# Patient Record
Sex: MALE | Race: WHITE | NOT HISPANIC OR LATINO | Employment: OTHER | URBAN - METROPOLITAN AREA
[De-identification: names, ages, dates, MRNs, and addresses within clinical notes are randomized per-mention and may not be internally consistent; named-entity substitution may affect disease eponyms.]

---

## 2024-01-16 ENCOUNTER — APPOINTMENT (OUTPATIENT)
Dept: RADIOLOGY | Facility: HOSPITAL | Age: 64
End: 2024-01-16
Payer: MEDICAID

## 2024-01-16 ENCOUNTER — HOSPITAL ENCOUNTER (EMERGENCY)
Facility: HOSPITAL | Age: 64
Discharge: HOME | End: 2024-01-16
Attending: STUDENT IN AN ORGANIZED HEALTH CARE EDUCATION/TRAINING PROGRAM
Payer: MEDICAID

## 2024-01-16 ENCOUNTER — APPOINTMENT (OUTPATIENT)
Dept: CARDIOLOGY | Facility: HOSPITAL | Age: 64
End: 2024-01-16
Payer: MEDICAID

## 2024-01-16 VITALS
OXYGEN SATURATION: 98 % | WEIGHT: 150 LBS | HEIGHT: 70 IN | TEMPERATURE: 99.5 F | DIASTOLIC BLOOD PRESSURE: 88 MMHG | SYSTOLIC BLOOD PRESSURE: 131 MMHG | HEART RATE: 88 BPM | BODY MASS INDEX: 21.47 KG/M2 | RESPIRATION RATE: 18 BRPM

## 2024-01-16 DIAGNOSIS — R06.2 WHEEZING: ICD-10-CM

## 2024-01-16 DIAGNOSIS — J01.90 SUBACUTE SINUSITIS, UNSPECIFIED LOCATION: Primary | ICD-10-CM

## 2024-01-16 DIAGNOSIS — R05.2 SUBACUTE COUGH: ICD-10-CM

## 2024-01-16 DIAGNOSIS — J04.0 LARYNGITIS: ICD-10-CM

## 2024-01-16 LAB
ALBUMIN SERPL-MCNC: 4.3 G/DL (ref 3.5–5)
ALP BLD-CCNC: 88 U/L (ref 35–125)
ALT SERPL-CCNC: 13 U/L (ref 5–40)
ANION GAP SERPL CALC-SCNC: 13 MMOL/L
AST SERPL-CCNC: 20 U/L (ref 5–40)
BASOPHILS # BLD AUTO: 0.02 X10*3/UL (ref 0–0.1)
BASOPHILS NFR BLD AUTO: 0.2 %
BILIRUB SERPL-MCNC: 1.4 MG/DL (ref 0.1–1.2)
BUN SERPL-MCNC: 14 MG/DL (ref 8–25)
CALCIUM SERPL-MCNC: 9.3 MG/DL (ref 8.5–10.4)
CHLORIDE SERPL-SCNC: 103 MMOL/L (ref 97–107)
CO2 SERPL-SCNC: 24 MMOL/L (ref 24–31)
CREAT SERPL-MCNC: 1 MG/DL (ref 0.4–1.6)
EGFRCR SERPLBLD CKD-EPI 2021: 85 ML/MIN/1.73M*2
EOSINOPHIL # BLD AUTO: 0.03 X10*3/UL (ref 0–0.7)
EOSINOPHIL NFR BLD AUTO: 0.4 %
ERYTHROCYTE [DISTWIDTH] IN BLOOD BY AUTOMATED COUNT: 12 % (ref 11.5–14.5)
FLUAV RNA RESP QL NAA+PROBE: NOT DETECTED
FLUBV RNA RESP QL NAA+PROBE: NOT DETECTED
GLUCOSE SERPL-MCNC: 104 MG/DL (ref 65–99)
HCT VFR BLD AUTO: 40.2 % (ref 41–52)
HGB BLD-MCNC: 13.5 G/DL (ref 13.5–17.5)
IMM GRANULOCYTES # BLD AUTO: 0.02 X10*3/UL (ref 0–0.7)
IMM GRANULOCYTES NFR BLD AUTO: 0.2 % (ref 0–0.9)
LYMPHOCYTES # BLD AUTO: 1.38 X10*3/UL (ref 1.2–4.8)
LYMPHOCYTES NFR BLD AUTO: 16.6 %
MCH RBC QN AUTO: 31.3 PG (ref 26–34)
MCHC RBC AUTO-ENTMCNC: 33.6 G/DL (ref 32–36)
MCV RBC AUTO: 93 FL (ref 80–100)
MONOCYTES # BLD AUTO: 0.52 X10*3/UL (ref 0.1–1)
MONOCYTES NFR BLD AUTO: 6.2 %
NEUTROPHILS # BLD AUTO: 6.36 X10*3/UL (ref 1.2–7.7)
NEUTROPHILS NFR BLD AUTO: 76.4 %
NRBC BLD-RTO: 0 /100 WBCS (ref 0–0)
NT-PROBNP SERPL-MCNC: 125 PG/ML (ref 0–177)
PLATELET # BLD AUTO: 207 X10*3/UL (ref 150–450)
POTASSIUM SERPL-SCNC: 3.7 MMOL/L (ref 3.4–5.1)
PROT SERPL-MCNC: 6.6 G/DL (ref 5.9–7.9)
RBC # BLD AUTO: 4.32 X10*6/UL (ref 4.5–5.9)
RSV RNA RESP QL NAA+PROBE: NOT DETECTED
SARS-COV-2 RNA RESP QL NAA+PROBE: NOT DETECTED
SODIUM SERPL-SCNC: 140 MMOL/L (ref 133–145)
TROPONIN T SERPL-MCNC: 14 NG/L
TROPONIN T SERPL-MCNC: 17 NG/L
WBC # BLD AUTO: 8.3 X10*3/UL (ref 4.4–11.3)

## 2024-01-16 PROCEDURE — 2500000001 HC RX 250 WO HCPCS SELF ADMINISTERED DRUGS (ALT 637 FOR MEDICARE OP): Performed by: CLINICAL NURSE SPECIALIST

## 2024-01-16 PROCEDURE — 36415 COLL VENOUS BLD VENIPUNCTURE: CPT | Performed by: CLINICAL NURSE SPECIALIST

## 2024-01-16 PROCEDURE — 99285 EMERGENCY DEPT VISIT HI MDM: CPT | Performed by: STUDENT IN AN ORGANIZED HEALTH CARE EDUCATION/TRAINING PROGRAM

## 2024-01-16 PROCEDURE — 96374 THER/PROPH/DIAG INJ IV PUSH: CPT

## 2024-01-16 PROCEDURE — 94640 AIRWAY INHALATION TREATMENT: CPT | Mod: 59

## 2024-01-16 PROCEDURE — 70491 CT SOFT TISSUE NECK W/DYE: CPT

## 2024-01-16 PROCEDURE — 84484 ASSAY OF TROPONIN QUANT: CPT | Performed by: CLINICAL NURSE SPECIALIST

## 2024-01-16 PROCEDURE — 2550000001 HC RX 255 CONTRASTS: Performed by: STUDENT IN AN ORGANIZED HEALTH CARE EDUCATION/TRAINING PROGRAM

## 2024-01-16 PROCEDURE — 87637 SARSCOV2&INF A&B&RSV AMP PRB: CPT | Performed by: CLINICAL NURSE SPECIALIST

## 2024-01-16 PROCEDURE — 2500000004 HC RX 250 GENERAL PHARMACY W/ HCPCS (ALT 636 FOR OP/ED): Performed by: CLINICAL NURSE SPECIALIST

## 2024-01-16 PROCEDURE — 85025 COMPLETE CBC W/AUTO DIFF WBC: CPT | Performed by: CLINICAL NURSE SPECIALIST

## 2024-01-16 PROCEDURE — 93005 ELECTROCARDIOGRAM TRACING: CPT

## 2024-01-16 PROCEDURE — 71046 X-RAY EXAM CHEST 2 VIEWS: CPT

## 2024-01-16 PROCEDURE — 83880 ASSAY OF NATRIURETIC PEPTIDE: CPT | Performed by: CLINICAL NURSE SPECIALIST

## 2024-01-16 PROCEDURE — 80053 COMPREHEN METABOLIC PANEL: CPT | Performed by: CLINICAL NURSE SPECIALIST

## 2024-01-16 RX ORDER — BENZONATATE 100 MG/1
100 CAPSULE ORAL 3 TIMES DAILY PRN
Qty: 21 CAPSULE | Refills: 0 | Status: SHIPPED | OUTPATIENT
Start: 2024-01-16 | End: 2024-01-23

## 2024-01-16 RX ORDER — ACETAMINOPHEN 325 MG/1
650 TABLET ORAL ONCE
Status: COMPLETED | OUTPATIENT
Start: 2024-01-16 | End: 2024-01-16

## 2024-01-16 RX ORDER — FAMOTIDINE 10 MG/ML
20 INJECTION INTRAVENOUS ONCE
Status: COMPLETED | OUTPATIENT
Start: 2024-01-16 | End: 2024-01-16

## 2024-01-16 RX ORDER — AMOXICILLIN AND CLAVULANATE POTASSIUM 875; 125 MG/1; MG/1
1 TABLET, FILM COATED ORAL 2 TIMES DAILY
Qty: 20 TABLET | Refills: 0 | Status: SHIPPED | OUTPATIENT
Start: 2024-01-16 | End: 2024-01-26

## 2024-01-16 RX ORDER — ALBUTEROL SULFATE 90 UG/1
2 AEROSOL, METERED RESPIRATORY (INHALATION) EVERY 4 HOURS PRN
Qty: 18 G | Refills: 0 | Status: SHIPPED | OUTPATIENT
Start: 2024-01-16 | End: 2024-01-23

## 2024-01-16 RX ORDER — GUAIFENESIN 100 MG/5ML
200 SOLUTION ORAL 3 TIMES DAILY PRN
Qty: 120 ML | Refills: 0 | Status: SHIPPED | OUTPATIENT
Start: 2024-01-16 | End: 2024-01-21

## 2024-01-16 RX ORDER — AMOXICILLIN AND CLAVULANATE POTASSIUM 875; 125 MG/1; MG/1
875 TABLET, FILM COATED ORAL ONCE
Status: COMPLETED | OUTPATIENT
Start: 2024-01-16 | End: 2024-01-16

## 2024-01-16 RX ADMIN — AMOXICILLIN AND CLAVULANATE POTASSIUM 875 MG: 875; 125 TABLET ORAL at 19:13

## 2024-01-16 RX ADMIN — FAMOTIDINE 20 MG: 10 INJECTION, SOLUTION INTRAVENOUS at 14:44

## 2024-01-16 RX ADMIN — IOHEXOL 75 ML: 350 INJECTION, SOLUTION INTRAVENOUS at 15:53

## 2024-01-16 RX ADMIN — SODIUM CHLORIDE 1000 ML: 9 INJECTION, SOLUTION INTRAVENOUS at 14:44

## 2024-01-16 RX ADMIN — RACEPINEPHRINE HYDROCHLORIDE 0.5 ML: 11.25 SOLUTION RESPIRATORY (INHALATION) at 18:26

## 2024-01-16 RX ADMIN — ACETAMINOPHEN 650 MG: 325 TABLET ORAL at 18:25

## 2024-01-16 ASSESSMENT — PAIN SCALES - GENERAL
PAINLEVEL_OUTOF10: 6
PAINLEVEL_OUTOF10: 2
PAINLEVEL_OUTOF10: 2

## 2024-01-16 ASSESSMENT — LIFESTYLE VARIABLES
EVER FELT BAD OR GUILTY ABOUT YOUR DRINKING: NO
HAVE YOU EVER FELT YOU SHOULD CUT DOWN ON YOUR DRINKING: NO
EVER HAD A DRINK FIRST THING IN THE MORNING TO STEADY YOUR NERVES TO GET RID OF A HANGOVER: NO
HAVE PEOPLE ANNOYED YOU BY CRITICIZING YOUR DRINKING: NO
REASON UNABLE TO ASSESS: NO

## 2024-01-16 ASSESSMENT — PAIN - FUNCTIONAL ASSESSMENT: PAIN_FUNCTIONAL_ASSESSMENT: 0-10

## 2024-01-16 ASSESSMENT — PAIN DESCRIPTION - ONSET: ONSET: ONGOING

## 2024-01-16 ASSESSMENT — COLUMBIA-SUICIDE SEVERITY RATING SCALE - C-SSRS
2. HAVE YOU ACTUALLY HAD ANY THOUGHTS OF KILLING YOURSELF?: NO
1. IN THE PAST MONTH, HAVE YOU WISHED YOU WERE DEAD OR WISHED YOU COULD GO TO SLEEP AND NOT WAKE UP?: NO
6. HAVE YOU EVER DONE ANYTHING, STARTED TO DO ANYTHING, OR PREPARED TO DO ANYTHING TO END YOUR LIFE?: NO

## 2024-01-16 ASSESSMENT — PAIN DESCRIPTION - DESCRIPTORS
DESCRIPTORS_3: TIGHTNESS
DESCRIPTORS: PRESSURE

## 2024-01-16 ASSESSMENT — PAIN DESCRIPTION - LOCATION
LOCATION: CHEST
LOCATION_3: ABDOMEN
LOCATION_2: THROAT

## 2024-01-16 ASSESSMENT — PAIN DESCRIPTION - PROGRESSION: CLINICAL_PROGRESSION: NOT CHANGED

## 2024-01-16 ASSESSMENT — PAIN DESCRIPTION - PAIN TYPE: TYPE: ACUTE PAIN

## 2024-01-16 NOTE — Clinical Note
Joo Lockhart was seen and treated in our emergency department on 1/16/2024.  He may return to work on 01/19/2024.  1-3 days if needed      If you have any questions or concerns, please don't hesitate to call.      Geno Donaldson, APRN-CNP

## 2024-01-16 NOTE — ED TRIAGE NOTES
63-year-old male presents with shortness of breath hoarse voice cough patient reports he has been having issues for some time now he is seeing his doctor they have been trying to figure out what is wrong with him for years.  Was seen evaluated by his doctor for tightness in his epigastric region reports when he coughs feels like it gets stuck.  Then he had a fall where he applied a heating pad to his back and his symptoms went away for 5 days he felt like a new person and that his symptoms returned.  Recently over the last 4 to 5 days he has noticed that his voice is more hoarse.  He has to bend over to cough.  Feels like his throat is tight and his vocal cords are stuck.  He denies any fever or chills.  Denies any abdominal pain other than his chronic abdominal pain.  No chest pain complains of wheezing he is a smoker but has not smoked today.  No ear pain no runny nose.  He does complain of sinus drainage..  Patient reports if he can stick a toothbrush to his throat and it seems to alleviate some of his symptoms.  .  Patient states he cannot do breathing treatments and cannot steroids because he does not compulsions in his neck.  Clinical presentation:  HEENT: Head normal cephalic atraumatic voice is hoarse.  Bilateral tympanic membranes pearly gray and intact.  No pain palpation of the maxillary frontal sinuses.  Posterior pharynx nonerythematous clear drainage.  Uvula midline palate symmetrical no signs of liquids angina or peritonsillar abscess    Neck: Supple no adenopathy no nuchal rigidity stridor trismus noted.  Trachea midline  Heart: Regular rate and rhythm S1-S2 no murmurs rubs or gallops mild tachycardia noted  Lungs: Mild tachypnea noted.  Clear to auscultation patient has a audible wheeze noted with talking.  No use of accessory muscles or nasal flaring noted no pursed of breathing or tripod positioning noted.  Harsh cough that is forced.    Abdomen: Soft nontender nondistended bowel sounds present x  4.  No guarding or rigidity negative for Andres sign abrades point tenderness.  No peritoneal signs noted.    Vital signs reviewed temp 99.5 heart rate 105 respiratory rate 24  98% on room air    Differentials include but not limited to viral illness versus COVID versus flu versus RSV versus vocal cord abnormality versus reflux versus COPD versus electrolyte abnormality versus dehydration.  Versus cervical foreign body low suspicion for acute coronary syndrome.    Plan:  Famotidine  Normal saline  Chest x-ray  EKG  CBC  CMP  proBNP  RSV  COVID flu  Troponin  Patient seen via provider in triage.  There was a limited examination due to the triage capacity.  The appropriate studies have been ordered in triage.  Patient should be seen for full evaluation/examination as well as follow-up for any completed imaging or lab work.

## 2024-01-16 NOTE — DISCHARGE INSTRUCTIONS
Increase fluids  Warm salt water gargles for sore throat pain and drainage do not swallow  Warm compresses to the face to help with sinus congestion  Flonase over-the-counter as directed  Take antibiotic with food, full glass water and supplement yogurt in your diet have a side effect of diarrhea  Albuterol inhaler for shortness of breath and wheezing  Guaifenesin for cough and congestion  Tessalon Perles for cough  Follow-up with primary care physician in 2 days for reevaluation  Follow-up with ENT within 1 week  Return to the emergency department any worsening symptoms or concerns  Coolmist vaporizer in the home  Today it was noted that your blood pressure was elevated follow-up with your primary care physician for reevaluation.  Journal your blood pressure daily and provide information to your primary care physician  Return to the emergency department any worsening symptoms or concerns  Try honey to help with cough

## 2024-01-16 NOTE — ED TRIAGE NOTES
Department of Emergency Medicine   ED  Provider Note  Admit Date/RoomTime: 1/16/2024  1:53 PM  ED Room: RESULTSWAITING/NONE        History of Present Illness:  Chief Complaint   Patient presents with    Shortness of Breath     SOB, sore throat, cough, chest pain 2/10 chest pain that goes up to 8/10 when coughing, feels stomach is tight 6/10, hoarse voice. Hx of sinus drainage problems but states his phlegm is never usually this dry.         Joo Lockhart is a   63-year-old male presents with shortness of breath hoarse voice cough patient reports he has been having issues for some time now he is seeing his doctor they have been trying to figure out what is wrong with him for years.  Was seen evaluated by his doctor for tightness in his epigastric region reports when he coughs feels like it gets stuck.  Then he had a fall where he applied a heating pad to his back and his symptoms went away for 5 days he felt like a new person and that his symptoms returned.  Recently over the last 4 to 5 days he has noticed that his voice is more hoarse.  He has to bend over to cough.  Feels like his throat is tight and his vocal cords are stuck.  He denies any fever or chills.  Denies any abdominal pain other than his chronic abdominal pain.  No chest pain complains of wheezing he is a smoker but has not smoked today.  No ear pain no runny nose.  He does complain of sinus drainage..  Patient reports if he can stick a toothbrush to his throat and it seems to alleviate some of his symptoms.  .  Patient states he cannot do breathing treatments and cannot steroids because he does not compulsions in his neck.         Review of Systems:   Pertinent positives and negatives are stated within HPI, all other systems reviewed and are negative.        --------------------------------------------- PAST HISTORY ---------------------------------------------  Past Medical History:  has no past medical history on file.  Past Surgical History:  has  no past surgical history on file.  Social History:    Family History: family history is not on file.. Unless otherwise noted, family history is non contributory  The patient’s home medications have been reviewed.  Allergies: Diphenhydramine hcl        ---------------------------------------------------PHYSICAL EXAM--------------------------------------    GENERAL APPEARANCE: Awake and alert.   VITAL SIGNS: As per the nurses' triage record.  Tachypneic.  Low-grade fever  HEENT: Normocephalic, atraumatic. Extraocular muscles are intact. Pupils equal round and reactive to light. Conjunctiva are pink. Negative scleral icterus. Mucous membranes are moist. Tongue in the midline. Pharynx was without erythema or exudates, uvula midline clear drainage posterior pharynx bilateral tympanic membranes pearly gray and intact.  Audible wheezes.  Voice is hoarse.  Patient is able to take a toothbrush down his throat coughs and then voice changes but remains raspy.  Patient able to talk few words but then starts with change in voice and cough.  NECK: Soft Nontender and supple, full gross ROM, no meningeal signs.  No nuchal rigidity.  Stridor noted.  No cervical adenopathy noted.  CHEST: Nontender to palpation. Clear to auscultation bilaterally. No rales, rhonchi, or wheezing.  No use of accessory muscles or nasal flaring noted.  No pursed of breathing or tripod positioning noted.  HEART: S1, S2.  Mild tachycardia regular rate and rhythm. No murmurs, gallops or rubs.  Strong and equal pulses in the extremities.   ABDOMEN: Soft, nontender, nondistended, positive bowel sounds, no palpable masses.  MUSCULCSKELETAL: The calves are nontender to palpation. Full gross active range of motion. Ambulating on own with no acute difficulties  NEUROLOGICAL: Awake, alert and oriented x 3. Power intact in the upper and lower extremities. Sensation is intact to light touch in the upper and lower extremities.   IMMUNOLOGICAL: No lymphatic streaking  "noted   DERM: No petechiae, rashes, or ecchymoses.          ------------------------- NURSING NOTES AND VITALS REVIEWED ---------------------------  The nursing notes within the ED encounter and vital signs as below have been reviewed by myself  BP (!) 128/96 (BP Location: Left arm, Patient Position: Sitting)   Pulse 105   Temp 37.5 °C (99.5 °F) (Tympanic)   Resp 24   Ht 1.778 m (5' 10\")   Wt 68 kg (150 lb)   SpO2 98%   BMI 21.52 kg/m²     Oxygen Saturation Interpretation: Normal      The patient’s available past medical records and past encounters were reviewed.          -----------------------DIAGNOSTIC RESULTS------------------------  LABS:    Labs Reviewed   CBC WITH AUTO DIFFERENTIAL - Abnormal       Result Value    WBC 8.3      nRBC 0.0      RBC 4.32 (*)     Hemoglobin 13.5      Hematocrit 40.2 (*)     MCV 93      MCH 31.3      MCHC 33.6      RDW 12.0      Platelets 207      Neutrophils % 76.4      Immature Granulocytes %, Automated 0.2      Lymphocytes % 16.6      Monocytes % 6.2      Eosinophils % 0.4      Basophils % 0.2      Neutrophils Absolute 6.36      Immature Granulocytes Absolute, Automated 0.02      Lymphocytes Absolute 1.38      Monocytes Absolute 0.52      Eosinophils Absolute 0.03      Basophils Absolute 0.02     COMPREHENSIVE METABOLIC PANEL   TROPONIN T SERIES, HIGH SENSITIVITY (0, 2 HR, 6 HR)    Narrative:     The following orders were created for panel order Troponin T Series, High Sensitivity (0, 2HR, 6HR).  Procedure                               Abnormality         Status                     ---------                               -----------         ------                     Serial Troponin, Initial...[[id number]]                      In process                 Serial Troponin, 2 Hour ...[[id number]]                                                   Please view results for these tests on the individual orders.   SARS-COV-2 AND INFLUENZA A/B PCR   RSV PCR   SERIAL TROPONIN, INITIAL " ([facility])   N-TERMINAL PROBNP   SERIAL TROPONIN,  2 HOUR ([facility])       As interpreted by me, the above displayed labs are abnormal. All other labs obtained during this visit were within normal range or not returned as of this dictation.      EKG Interpretation  1434 Performed at  1345, HR of 81, NSR, NAD, QTc 422, no sign of STEMI or NSTEMI, no Q wave or T wave abnormality noted.    Reviewed and interpreted by me at 1430   []           XR chest 2 views   Final Result   No acute cardiopulmonary process.                  Signed by: Jhon Langston 1/16/2024 2:40 PM   Dictation workstation:   [id number]      CT soft tissue neck w IV contrast    (Results Pending)           XR chest 2 views   Final Result   No acute cardiopulmonary process.                  Signed by: Jhon Langston 1/16/2024 2:40 PM   Dictation workstation:   [id number]      CT soft tissue neck w IV contrast    (Results Pending)           ------------------------------ ED COURSE/MEDICAL DECISION MAKING----------------------  Medical Decision Making:   Exam: A medically appropriate exam performed, outlined above, given the known history and presentation.    History obtained from: Review of medical record nursing notes patient      Social Determinants of Health considered during this visit: Takes care of himself at home      PAST MEDICAL HISTORY/Chronic Conditions Affecting Care     has no past medical history on file.       CC/HPI Summary, Social Determinants of health, Records Reviewed, DDx, testing done/not done, ED Course, Reassessment, disposition considerations/shared decision making with patient, consults, disposition:   Presents with voice change cough shortness of breath  Plan:  Tylenol  Pepcid  Normal saline  CT cervical-No evidence for mass, inflammatory process, radiopaque foreign body,  or acute pathology identified within the soft tissues of the neck.      Mild-moderate emphysematous changes, more prominent in the lung apices  Chest  x-ray-No acute cardiopulmonary process.   EKG  CBC  CMP  proBNP  RSV  COVID flu  Troponin  Medical Decision Making/Differential Diagnosis:  Differentials include but not limited to foreign body versus nodule versus COPD exacerbation versus COVID versus flu versus RSV versus viral illness versus pharyngitis versus sinusitis versus mass versus goiter   COVID-negative  Flu negative  RSV negative  Troponin 14  proBNP 125  White blood cell count 8.3  Hemoglobin 13.5  Glucose 104  Electrolytes within normal limits  BUN 14  Creatinine 1  LFTs within normal limits  Bili total 1.4 no signs of jaundice  Patient presents with cough change in voice.  Upon review electrolytes are within normal limits no elevation white blood cell count.  COVID flu RSV negative.  proBNP within normal limits troponin is within normal limits EKG per attending note no ST elevation or arrhythmia noted sinus rhythm.  Normal renal function LFTs within normal limits.  Patient forcibly coughs.  No drainage from his back of his throat.  Stridor noted after forceful cough.  After 1 episode of forceful cough patient did have sputum that was blood-tinged.  No blood clots noted.  PROCEDURES  Unless otherwise noted below, none      CONSULTS:   None      ED Course as of 01/16/24 1643   Tue Jan 16, 2024   1434 Performed at  1345, HR of 81, NSR, NAD, QTc 422, no sign of STEMI or NSTEMI, no Q wave or T wave abnormality noted.    Reviewed and interpreted by me at 1430   [JM]   1549 Patient reported he was unable to do his CAT scan because he was short of breath.  Brought back by the radiology tech.  Patient has hoarse voice.  C/o a lot of drainage need to go to the bathroom to use his toothbrush technique to loosen up the drainage.  Called me into the bathroom was notified to have a cloudy white sputum with blood-tinged around the outside.  Patient is forcing himself to cough.  Likely secondary to irritation.  Sucking in the drainage from his nose and then forcibly  coughs. [TB]      ED Course User Index  [JM] Merlene Flores MD  [TB] Geno Donaldson, APRN-CNP         This patient has remained hemodynamically stable during their ED course.      Critical Care: none        Counseling:  The emergency provider has spoken with the patient and discussed today’s results, in addition to providing specific details for the plan of care and counseling regarding the diagnosis and prognosis.  Questions are answered at this time and they are agreeable with the plan.         --------------------------------- IMPRESSION AND DISPOSITION ---------------------------------    IMPRESSION  No diagnosis found.    DISPOSITION  Disposition: {Plan; disposition:16813}  Patient condition is {condition:92867}        NOTE: This report was transcribed using voice recognition software. Every effort was made to ensure accuracy; however, inadvertent computerized transcription errors may be present

## 2024-01-16 NOTE — ED PROVIDER NOTES
Department of Emergency Medicine   ED  Provider Note  Admit Date/RoomTime: 1/16/2024  1:53 PM  ED Room: RESULTSWAITING/NONE        History of Present Illness:  Chief Complaint   Patient presents with    Shortness of Breath     SOB, sore throat, cough, chest pain 2/10 chest pain that goes up to 8/10 when coughing, feels stomach is tight 6/10, hoarse voice. Hx of sinus drainage problems but states his phlegm is never usually this dry.         Joo Lockhart is a   63-year-old male presents with shortness of breath hoarse voice cough patient reports he has been having issues for some time now he is seeing his doctor they have been trying to figure out what is wrong with him for years.  Was seen evaluated by his doctor for tightness in his epigastric region reports when he coughs feels like it gets stuck.  Then he had a fall where he applied a heating pad to his back and his symptoms went away for 5 days he felt like a new person and that his symptoms returned.  Recently over the last 4 to 5 days he has noticed that his voice is more hoarse.  He has to bend over to cough.  Feels like his throat is tight and his vocal cords are stuck.  He denies any fever or chills.  Denies any abdominal pain other than his chronic abdominal pain.  No chest pain complains of wheezing he is a smoker but has not smoked today.  No ear pain no runny nose.  He does complain of sinus drainage..  Patient reports if he can stick a toothbrush to his throat and it seems to alleviate some of his symptoms.  .  Patient states he cannot do breathing treatments and cannot steroids because he does not compulsions in his neck.         Review of Systems:   Pertinent positives and negatives are stated within HPI, all other systems reviewed and are negative.        --------------------------------------------- PAST HISTORY ---------------------------------------------  Past Medical History:  has no past medical history on file.  Past Surgical History:  has  no past surgical history on file.  Social History:    Family History: family history is not on file.. Unless otherwise noted, family history is non contributory  The patient’s home medications have been reviewed.  Allergies: Diphenhydramine hcl        ---------------------------------------------------PHYSICAL EXAM--------------------------------------    GENERAL APPEARANCE: Awake and alert.   VITAL SIGNS: As per the nurses' triage record.  Tachypneic.  Low-grade fever  HEENT: Normocephalic, atraumatic. Extraocular muscles are intact. Pupils equal round and reactive to light. Conjunctiva are pink. Negative scleral icterus. Mucous membranes are moist. Tongue in the midline. Pharynx was without erythema or exudates, uvula midline clear drainage posterior pharynx bilateral tympanic membranes pearly gray and intact.  Audible wheezes.  Voice is hoarse.  Patient is able to take a toothbrush down his throat coughs and then voice changes but remains raspy.  Patient able to talk few words but then starts with change in voice and cough.  NECK: Soft Nontender and supple, full gross ROM, no meningeal signs.  No nuchal rigidity.  Stridor noted.  No cervical adenopathy noted.  CHEST: Nontender to palpation. Clear to auscultation bilaterally. No rales, rhonchi, or wheezing.  No use of accessory muscles or nasal flaring noted.  No pursed of breathing or tripod positioning noted.  HEART: S1, S2.  Mild tachycardia regular rate and rhythm. No murmurs, gallops or rubs.  Strong and equal pulses in the extremities.   ABDOMEN: Soft, nontender, nondistended, positive bowel sounds, no palpable masses.  MUSCULCSKELETAL: The calves are nontender to palpation. Full gross active range of motion. Ambulating on own with no acute difficulties  NEUROLOGICAL: Awake, alert and oriented x 3. Power intact in the upper and lower extremities. Sensation is intact to light touch in the upper and lower extremities.   IMMUNOLOGICAL: No lymphatic streaking  "noted   DERM: No petechiae, rashes, or ecchymoses.          ------------------------- NURSING NOTES AND VITALS REVIEWED ---------------------------  The nursing notes within the ED encounter and vital signs as below have been reviewed by myself  BP (!) 128/96 (BP Location: Left arm, Patient Position: Sitting)   Pulse 105   Temp 37.5 °C (99.5 °F) (Tympanic)   Resp 24   Ht 1.778 m (5' 10\")   Wt 68 kg (150 lb)   SpO2 98%   BMI 21.52 kg/m²     Oxygen Saturation Interpretation: Normal      The patient’s available past medical records and past encounters were reviewed.          -----------------------DIAGNOSTIC RESULTS------------------------  LABS:    Labs Reviewed   CBC WITH AUTO DIFFERENTIAL - Abnormal       Result Value    WBC 8.3      nRBC 0.0      RBC 4.32 (*)     Hemoglobin 13.5      Hematocrit 40.2 (*)     MCV 93      MCH 31.3      MCHC 33.6      RDW 12.0      Platelets 207      Neutrophils % 76.4      Immature Granulocytes %, Automated 0.2      Lymphocytes % 16.6      Monocytes % 6.2      Eosinophils % 0.4      Basophils % 0.2      Neutrophils Absolute 6.36      Immature Granulocytes Absolute, Automated 0.02      Lymphocytes Absolute 1.38      Monocytes Absolute 0.52      Eosinophils Absolute 0.03      Basophils Absolute 0.02     COMPREHENSIVE METABOLIC PANEL   TROPONIN T SERIES, HIGH SENSITIVITY (0, 2 HR, 6 HR)    Narrative:     The following orders were created for panel order Troponin T Series, High Sensitivity (0, 2HR, 6HR).  Procedure                               Abnormality         Status                     ---------                               -----------         ------                     Serial Troponin, Initial...[[id number]]                      In process                 Serial Troponin, 2 Hour ...[[id number]]                                                   Please view results for these tests on the individual orders.   SARS-COV-2 AND INFLUENZA A/B PCR   RSV PCR   SERIAL TROPONIN, INITIAL " ([facility])   N-TERMINAL PROBNP   SERIAL TROPONIN,  2 HOUR ([facility])       As interpreted by me, the above displayed labs are abnormal. All other labs obtained during this visit were within normal range or not returned as of this dictation.      EKG Interpretation  1434 Performed at  1345, HR of 81, NSR, NAD, QTc 422, no sign of STEMI or NSTEMI, no Q wave or T wave abnormality noted.    Reviewed and interpreted by me at 1430   []           XR chest 2 views   Final Result   No acute cardiopulmonary process.                  Signed by: Jhon Langston 1/16/2024 2:40 PM   Dictation workstation:   [id number]      CT soft tissue neck w IV contrast    (Results Pending)           XR chest 2 views   Final Result   No acute cardiopulmonary process.                  Signed by: Jhon Langston 1/16/2024 2:40 PM   Dictation workstation:   [id number]      CT soft tissue neck w IV contrast    (Results Pending)           ------------------------------ ED COURSE/MEDICAL DECISION MAKING----------------------  Medical Decision Making:   Exam: A medically appropriate exam performed, outlined above, given the known history and presentation.    History obtained from: Review of medical record nursing notes patient      Social Determinants of Health considered during this visit: Takes care of himself at home      PAST MEDICAL HISTORY/Chronic Conditions Affecting Care     has no past medical history on file.       CC/HPI Summary, Social Determinants of health, Records Reviewed, DDx, testing done/not done, ED Course, Reassessment, disposition considerations/shared decision making with patient, consults, disposition:   Presents with voice change cough shortness of breath  Plan:  Tylenol  Pepcid  Normal saline  CT cervical-No evidence for mass, inflammatory process, radiopaque foreign body,  or acute pathology identified within the soft tissues of the neck.      Mild-moderate emphysematous changes, more prominent in the lung apices  Chest  x-ray-No acute cardiopulmonary process.   EKG  CBC  CMP  proBNP  RSV  COVID flu  Troponin  Medical Decision Making/Differential Diagnosis:  Differentials include but not limited to foreign body versus nodule versus COPD exacerbation versus COVID versus flu versus RSV versus viral illness versus pharyngitis versus sinusitis versus mass versus goiter   COVID-negative  Flu negative  RSV negative  Troponin 14 repeat trop 17  proBNP 125  White blood cell count 8.3  Hemoglobin 13.5  Glucose 104  Electrolytes within normal limits  BUN 14  Creatinine 1  LFTs within normal limits  Bili total 1.4 no signs of jaundice  Patient presents with cough change in voice.  Upon review electrolytes are within normal limits no elevation white blood cell count.  COVID flu RSV negative.  proBNP within normal limits troponin 14 repeat 17 no complaints of chest pain.  EKG per attending note no ST elevation or arrhythmia noted sinus rhythm.  Normal renal function LFTs within normal limits.  Patient forcibly coughs.  No drainage from his back of his throat.  Stridor noted after forceful cough.  After 1 episode of forceful cough patient did have sputum that was blood-tinged.  No blood clots noted.  CT of the soft tissue neck showed no evidence of mass inflammatory process radiopaque foreign body no acute pathology within the soft tissue of the neck.  Patient did have emphysematous changes.  No cardiopulmonary process noted on chest x-ray.  Discussed these results with the patient in great detail.  Patient reports he also does not have lung nodules in the past.  He has been evaluated multiple times by ENT for the symptoms.  Reports that he has also had his abdominal evaluated.  When he forcibly sucks in the area after eating he will notice that he has food, his nose.  Advised patient on not doing this.  Patient has struggles that he has wrapped up that he will stick in his nose to help with his breathing.  He feels that all of his inflammation  is at the outer aspect of his nose and in the middle part of his throat.  He does have history of vocal cord paralysis that his reports his voice has been hoarse since he was 37 years old.  He is in no acute distress at this time.  Did receive racemic epi reports some improvement of his symptoms.  He is now coughing up what he describes as brown sputum.  Patient continues to have a forceful cough.  Advised patient that this may be irritating his bronchioles as well as in his esophagus.  Advised him to monitor for worsening signs symptoms infection close follow-up referral to ENT provided.  Patient verbalizes understanding.  Was given a prescription for Tessalon Perles to help with cough medicine for cough and congestion albuterol Hailer for shortness of breath or wheezing also placed on Augmentin for concern of sinusitis most of his symptoms are coming from drainage from the sinus area has been ongoing for some time now.  Patient received first dose in the emergency department he did take a half on amoxicillin yesterday and reports that this did seem to help his symptoms.  Advised him to stay away from Afrin.  He could use Flonase over-the-counter.  Patient was seen evaluated with attending physician Dr. Flores   Based on patient's clinical presentation history and symptoms consistent with laryngitis  Sinusitis  Dyspnea  Wheezing.  PROCEDURES  Unless otherwise noted below, none      CONSULTS:   None      ED Course as of 01/16/24 1643   Tue Jan 16, 2024   1434 Performed at  1345, HR of 81, NSR, NAD, QTc 422, no sign of STEMI or NSTEMI, no Q wave or T wave abnormality noted.    Reviewed and interpreted by me at 1430   [JM]   1540 Patient reported he was unable to do his CAT scan because he was short of breath.  Brought back by the radiology tech.  Patient has hoarse voice.  C/o a lot of drainage need to go to the bathroom to use his toothbrush technique to loosen up the drainage.  Called me into the bathroom was  notified to have a cloudy white sputum with blood-tinged around the outside.  Patient is forcing himself to cough.  Likely secondary to irritation.  Sucking in the drainage from his nose and then forcibly coughs. [TB]      ED Course User Index  [JM] Merlene Flores MD  [TB] RODOLFO Young-CNP         This patient has remained hemodynamically stable during their ED course.      Critical Care: none        Counseling:  The emergency provider has spoken with the patient and discussed today’s results, in addition to providing specific details for the plan of care and counseling regarding the diagnosis and prognosis.  Questions are answered at this time and they are agreeable with the plan.         --------------------------------- IMPRESSION AND DISPOSITION ---------------------------------    IMPRESSION  Subacute sinusitis  Subacute cough  Wheezing  Laryngitis    DISPOSITION  Disposition: Discharge home  Patient condition is stable improved        NOTE: This report was transcribed using voice recognition software. Every effort was made to ensure accuracy; however, inadvertent computerized transcription errors may be present      SHERYL Young  01/16/24 1957

## 2024-01-17 ENCOUNTER — APPOINTMENT (OUTPATIENT)
Dept: RADIOLOGY | Facility: HOSPITAL | Age: 64
End: 2024-01-17
Payer: MEDICAID

## 2024-01-17 ENCOUNTER — HOSPITAL ENCOUNTER (EMERGENCY)
Facility: HOSPITAL | Age: 64
Discharge: HOME | End: 2024-01-17
Attending: STUDENT IN AN ORGANIZED HEALTH CARE EDUCATION/TRAINING PROGRAM
Payer: MEDICAID

## 2024-01-17 VITALS
BODY MASS INDEX: 22.19 KG/M2 | TEMPERATURE: 100 F | WEIGHT: 155 LBS | OXYGEN SATURATION: 99 % | HEART RATE: 77 BPM | HEIGHT: 70 IN | DIASTOLIC BLOOD PRESSURE: 88 MMHG | RESPIRATION RATE: 16 BRPM | SYSTOLIC BLOOD PRESSURE: 159 MMHG

## 2024-01-17 DIAGNOSIS — R13.10 DYSPHAGIA, UNSPECIFIED TYPE: ICD-10-CM

## 2024-01-17 DIAGNOSIS — R10.84 GENERALIZED ABDOMINAL PAIN: Primary | ICD-10-CM

## 2024-01-17 LAB
ALBUMIN SERPL-MCNC: 4.4 G/DL (ref 3.5–5)
ALP BLD-CCNC: 85 U/L (ref 35–125)
ALT SERPL-CCNC: 14 U/L (ref 5–40)
ANION GAP SERPL CALC-SCNC: 13 MMOL/L
APPEARANCE UR: CLEAR
AST SERPL-CCNC: 23 U/L (ref 5–40)
BASOPHILS # BLD AUTO: 0.04 X10*3/UL (ref 0–0.1)
BASOPHILS NFR BLD AUTO: 0.5 %
BILIRUB SERPL-MCNC: 1.5 MG/DL (ref 0.1–1.2)
BILIRUB UR STRIP.AUTO-MCNC: NEGATIVE MG/DL
BUN SERPL-MCNC: 16 MG/DL (ref 8–25)
CALCIUM SERPL-MCNC: 9.2 MG/DL (ref 8.5–10.4)
CHLORIDE SERPL-SCNC: 105 MMOL/L (ref 97–107)
CO2 SERPL-SCNC: 25 MMOL/L (ref 24–31)
COLOR UR: ABNORMAL
CREAT SERPL-MCNC: 1.1 MG/DL (ref 0.4–1.6)
EGFRCR SERPLBLD CKD-EPI 2021: 75 ML/MIN/1.73M*2
EOSINOPHIL # BLD AUTO: 0.04 X10*3/UL (ref 0–0.7)
EOSINOPHIL NFR BLD AUTO: 0.5 %
ERYTHROCYTE [DISTWIDTH] IN BLOOD BY AUTOMATED COUNT: 12.2 % (ref 11.5–14.5)
GLUCOSE SERPL-MCNC: 97 MG/DL (ref 65–99)
GLUCOSE UR STRIP.AUTO-MCNC: NORMAL MG/DL
HCT VFR BLD AUTO: 40 % (ref 41–52)
HGB BLD-MCNC: 13.1 G/DL (ref 13.5–17.5)
IMM GRANULOCYTES # BLD AUTO: 0.03 X10*3/UL (ref 0–0.7)
IMM GRANULOCYTES NFR BLD AUTO: 0.3 % (ref 0–0.9)
KETONES UR STRIP.AUTO-MCNC: ABNORMAL MG/DL
LEUKOCYTE ESTERASE UR QL STRIP.AUTO: NEGATIVE
LIPASE SERPL-CCNC: 24 U/L (ref 16–63)
LYMPHOCYTES # BLD AUTO: 1.54 X10*3/UL (ref 1.2–4.8)
LYMPHOCYTES NFR BLD AUTO: 17.7 %
MCH RBC QN AUTO: 30.4 PG (ref 26–34)
MCHC RBC AUTO-ENTMCNC: 32.8 G/DL (ref 32–36)
MCV RBC AUTO: 93 FL (ref 80–100)
MONOCYTES # BLD AUTO: 0.57 X10*3/UL (ref 0.1–1)
MONOCYTES NFR BLD AUTO: 6.5 %
MUCOUS THREADS #/AREA URNS AUTO: ABNORMAL /LPF
NEUTROPHILS # BLD AUTO: 6.49 X10*3/UL (ref 1.2–7.7)
NEUTROPHILS NFR BLD AUTO: 74.5 %
NITRITE UR QL STRIP.AUTO: NEGATIVE
NRBC BLD-RTO: 0 /100 WBCS (ref 0–0)
PH UR STRIP.AUTO: 6 [PH]
PLATELET # BLD AUTO: 197 X10*3/UL (ref 150–450)
POTASSIUM SERPL-SCNC: 3.6 MMOL/L (ref 3.4–5.1)
PROT SERPL-MCNC: 7 G/DL (ref 5.9–7.9)
PROT UR STRIP.AUTO-MCNC: ABNORMAL MG/DL
RBC # BLD AUTO: 4.31 X10*6/UL (ref 4.5–5.9)
RBC # UR STRIP.AUTO: ABNORMAL /UL
RBC #/AREA URNS AUTO: ABNORMAL /HPF
SODIUM SERPL-SCNC: 143 MMOL/L (ref 133–145)
SP GR UR STRIP.AUTO: 1.03
UROBILINOGEN UR STRIP.AUTO-MCNC: NORMAL MG/DL
WBC # BLD AUTO: 8.7 X10*3/UL (ref 4.4–11.3)
WBC #/AREA URNS AUTO: ABNORMAL /HPF

## 2024-01-17 PROCEDURE — 2500000001 HC RX 250 WO HCPCS SELF ADMINISTERED DRUGS (ALT 637 FOR MEDICARE OP): Performed by: CLINICAL NURSE SPECIALIST

## 2024-01-17 PROCEDURE — 36415 COLL VENOUS BLD VENIPUNCTURE: CPT | Performed by: CLINICAL NURSE SPECIALIST

## 2024-01-17 PROCEDURE — 85025 COMPLETE CBC W/AUTO DIFF WBC: CPT | Performed by: CLINICAL NURSE SPECIALIST

## 2024-01-17 PROCEDURE — 96375 TX/PRO/DX INJ NEW DRUG ADDON: CPT

## 2024-01-17 PROCEDURE — 99284 EMERGENCY DEPT VISIT MOD MDM: CPT | Performed by: STUDENT IN AN ORGANIZED HEALTH CARE EDUCATION/TRAINING PROGRAM

## 2024-01-17 PROCEDURE — 2550000001 HC RX 255 CONTRASTS: Performed by: CLINICAL NURSE SPECIALIST

## 2024-01-17 PROCEDURE — 74177 CT ABD & PELVIS W/CONTRAST: CPT

## 2024-01-17 PROCEDURE — 2550000001 HC RX 255 CONTRASTS: Performed by: STUDENT IN AN ORGANIZED HEALTH CARE EDUCATION/TRAINING PROGRAM

## 2024-01-17 PROCEDURE — 96374 THER/PROPH/DIAG INJ IV PUSH: CPT | Mod: 59

## 2024-01-17 PROCEDURE — 81001 URINALYSIS AUTO W/SCOPE: CPT | Performed by: CLINICAL NURSE SPECIALIST

## 2024-01-17 PROCEDURE — 2500000004 HC RX 250 GENERAL PHARMACY W/ HCPCS (ALT 636 FOR OP/ED): Performed by: CLINICAL NURSE SPECIALIST

## 2024-01-17 PROCEDURE — 80053 COMPREHEN METABOLIC PANEL: CPT | Performed by: CLINICAL NURSE SPECIALIST

## 2024-01-17 PROCEDURE — 96372 THER/PROPH/DIAG INJ SC/IM: CPT

## 2024-01-17 PROCEDURE — 83690 ASSAY OF LIPASE: CPT | Performed by: CLINICAL NURSE SPECIALIST

## 2024-01-17 RX ORDER — DICYCLOMINE HYDROCHLORIDE 10 MG/ML
20 INJECTION INTRAMUSCULAR ONCE
Status: COMPLETED | OUTPATIENT
Start: 2024-01-17 | End: 2024-01-17

## 2024-01-17 RX ORDER — FAMOTIDINE 20 MG/1
20 TABLET, FILM COATED ORAL 2 TIMES DAILY
Qty: 14 TABLET | Refills: 0 | Status: SHIPPED | OUTPATIENT
Start: 2024-01-17 | End: 2024-01-24

## 2024-01-17 RX ORDER — HYDROXYZINE HYDROCHLORIDE 25 MG/1
25 TABLET, FILM COATED ORAL ONCE
Status: COMPLETED | OUTPATIENT
Start: 2024-01-17 | End: 2024-01-17

## 2024-01-17 RX ORDER — ALUMINUM HYDROXIDE, MAGNESIUM HYDROXIDE, AND SIMETHICONE 1200; 120; 1200 MG/30ML; MG/30ML; MG/30ML
30 SUSPENSION ORAL ONCE
Status: COMPLETED | OUTPATIENT
Start: 2024-01-17 | End: 2024-01-17

## 2024-01-17 RX ORDER — FAMOTIDINE 10 MG/ML
40 INJECTION INTRAVENOUS ONCE
Status: COMPLETED | OUTPATIENT
Start: 2024-01-17 | End: 2024-01-17

## 2024-01-17 RX ORDER — LIDOCAINE HYDROCHLORIDE 20 MG/ML
1.25 SOLUTION OROPHARYNGEAL ONCE
Status: COMPLETED | OUTPATIENT
Start: 2024-01-17 | End: 2024-01-17

## 2024-01-17 RX ORDER — KETOROLAC TROMETHAMINE 30 MG/ML
15 INJECTION, SOLUTION INTRAMUSCULAR; INTRAVENOUS ONCE
Status: COMPLETED | OUTPATIENT
Start: 2024-01-17 | End: 2024-01-17

## 2024-01-17 RX ORDER — DICYCLOMINE HYDROCHLORIDE 20 MG/1
20 TABLET ORAL 4 TIMES DAILY PRN
Qty: 16 TABLET | Refills: 0 | Status: SHIPPED | OUTPATIENT
Start: 2024-01-17 | End: 2024-01-21

## 2024-01-17 RX ORDER — SUCRALFATE 1 G/10ML
1 SUSPENSION ORAL
Qty: 280 ML | Refills: 0 | Status: SHIPPED | OUTPATIENT
Start: 2024-01-17 | End: 2024-01-24

## 2024-01-17 RX ADMIN — HYDROXYZINE HYDROCHLORIDE 25 MG: 25 TABLET, FILM COATED ORAL at 11:28

## 2024-01-17 RX ADMIN — LIDOCAINE HYDROCHLORIDE 1.25 ML: 20 SOLUTION ORAL; TOPICAL at 11:28

## 2024-01-17 RX ADMIN — IOHEXOL 75 ML: 350 INJECTION, SOLUTION INTRAVENOUS at 12:03

## 2024-01-17 RX ADMIN — FAMOTIDINE 40 MG: 10 INJECTION, SOLUTION INTRAVENOUS at 11:28

## 2024-01-17 RX ADMIN — ALUMINUM HYDROXIDE, MAGNESIUM HYDROXIDE, AND SIMETHICONE 30 ML: 200; 200; 20 SUSPENSION ORAL at 11:27

## 2024-01-17 RX ADMIN — KETOROLAC TROMETHAMINE 15 MG: 30 INJECTION, SOLUTION INTRAMUSCULAR at 11:28

## 2024-01-17 RX ADMIN — DICYCLOMINE HYDROCHLORIDE 20 MG: 20 INJECTION, SOLUTION INTRAMUSCULAR at 11:27

## 2024-01-17 RX ADMIN — SODIUM CHLORIDE 1000 ML: 9 INJECTION, SOLUTION INTRAVENOUS at 11:47

## 2024-01-17 ASSESSMENT — PAIN - FUNCTIONAL ASSESSMENT
PAIN_FUNCTIONAL_ASSESSMENT: 0-10
PAIN_FUNCTIONAL_ASSESSMENT: 0-10

## 2024-01-17 ASSESSMENT — COLUMBIA-SUICIDE SEVERITY RATING SCALE - C-SSRS
2. HAVE YOU ACTUALLY HAD ANY THOUGHTS OF KILLING YOURSELF?: NO
6. HAVE YOU EVER DONE ANYTHING, STARTED TO DO ANYTHING, OR PREPARED TO DO ANYTHING TO END YOUR LIFE?: NO
1. IN THE PAST MONTH, HAVE YOU WISHED YOU WERE DEAD OR WISHED YOU COULD GO TO SLEEP AND NOT WAKE UP?: NO

## 2024-01-17 ASSESSMENT — LIFESTYLE VARIABLES
EVER FELT BAD OR GUILTY ABOUT YOUR DRINKING: NO
REASON UNABLE TO ASSESS: NO
EVER HAD A DRINK FIRST THING IN THE MORNING TO STEADY YOUR NERVES TO GET RID OF A HANGOVER: NO
HAVE YOU EVER FELT YOU SHOULD CUT DOWN ON YOUR DRINKING: NO
HAVE PEOPLE ANNOYED YOU BY CRITICIZING YOUR DRINKING: NO

## 2024-01-17 ASSESSMENT — PAIN SCALES - GENERAL
PAINLEVEL_OUTOF10: 6
PAINLEVEL_OUTOF10: 6

## 2024-01-17 ASSESSMENT — PAIN DESCRIPTION - LOCATION
LOCATION: ABDOMEN
LOCATION: ABDOMEN

## 2024-01-17 NOTE — DISCHARGE INSTRUCTIONS
[place] diet  Follow-up with primary care physician 2 days for reevaluation  Follow-up with gastroenterologist within 1 week  Return to the emergency department any worsening symptoms or concerns  Incidental findings of CT has been discussed with you.  It is importantly follow-up with your gastroenterologist and primary care physician for reevaluation.  Today it was noted that your blood pressure was elevated follow-up with primary care physician for reevaluation.  Monitor your blood pressure daily blood pressure results.  For your primary care physician return to the emergency department any worsening symptoms or concerns

## 2024-01-17 NOTE — ED PROVIDER NOTES
Department of Emergency Medicine   ED  Provider Note  Admit Date/RoomTime: 1/17/2024 10:59 AM  ED Room: RESULTSWAITING/NONE        History of Present Illness:  Chief Complaint   Patient presents with   • Abdominal Pain         Joo Lockhart is a 63 y.o. male history of vocal paralysis, COPD, acid reflux presenting to the ED for abdominal tightness.  Patient reports he was seen yesterday for hoarse voice cough wheezing difficulty breathing.  Patient states though symptoms seem to improved after receiving antibiotic and treatment here in emergency department although he has not abdominal tightness.  This been ongoing issue for some time.  Patient has been evaluated by GI has had abdominal series.  Barium swallow test.  But today felt like his abdominal discomfort was worse.  He is having difficulty with bowel movements.  Reported no bowel movement today he had small pellets of stool.  No pressure burning or frequency of urination denies any fever or chills.  Reports it is hoarse voice is somewhat better he does do a toothbrush routine in the morning when he sticks his toothbrush down his throat into each side this has been an ongoing thing for him.  He denies chest pain.  No fever or chills.  No vomiting.  Patient states last night he woke up and had to sit up on pillows he believes his acid reflux may be bothering him.,   Review of Systems:   Pertinent positives and negatives are stated within HPI, all other systems reviewed and are negative.        --------------------------------------------- PAST HISTORY ---------------------------------------------  Past Medical History:  has no past medical history on file.  Past Surgical History:  has no past surgical history on file.  Social History:    Family History: family history is not on file.. Unless otherwise noted, family history is non contributory  The patient’s home medications have been reviewed.  Allergies: Diphenhydramine  "hcl        ---------------------------------------------------PHYSICAL EXAM--------------------------------------    GENERAL APPEARANCE: Awake and alert.   VITAL SIGNS: As per the nurses' triage record.  Low-grade fever 100.8 blood pressure is elevated  HEENT: Normocephalic, atraumatic. Extraocular muscles are intact.  Voice is hoarse mucous membranes dry pupils equal round and reactive to light. Conjunctiva are pink. Negative scleral icterus. Mucous membranes are moist. Tongue in the midline. Pharynx was without erythema or exudates, uvula midline audible wheeze improved from yesterday.  NECK: Soft Nontender and supple, full gross ROM, no meningeal signs.  CHEST: Nontender to palpation. Clear to auscultation bilaterally. No rales, rhonchi, or wheezing.   HEART: S1, S2. Regular rate and rhythm. No murmurs, gallops or rubs.  Strong and equal pulses in the extremities.   ABDOMEN: Soft, diffuse tenderness nondistended, positive bowel sounds, no palpable masses.  No rebound or guarding negative Andres sign abrades point tenderness.  No peritoneal signs noted.  MUSCULCSKELETAL: The calves are nontender to palpation. Full gross active range of motion. Ambulating on own with no acute difficulties  NEUROLOGICAL: Awake, alert and oriented x 3. Power intact in the upper and lower extremities. Sensation is intact to light touch in the upper and lower extremities.   IMMUNOLOGICAL: No lymphatic streaking noted   DERM: No petechiae, rashes, or ecchymoses.          ------------------------- NURSING NOTES AND VITALS REVIEWED ---------------------------  The nursing notes within the ED encounter and vital signs as below have been reviewed by myself  /88 (BP Location: Left arm, Patient Position: Sitting)   Pulse 77   Temp 37.8 °C (100 °F)   Resp 16   Ht 1.778 m (5' 10\")   Wt 70.3 kg (155 lb)   SpO2 99%   BMI 22.24 kg/m²     Oxygen Saturation Interpretation: Normal      The patient’s available past medical records and " past encounters were reviewed.          -----------------------DIAGNOSTIC RESULTS------------------------  LABS:    Labs Reviewed   CBC WITH AUTO DIFFERENTIAL - Abnormal       Result Value    WBC 8.7      nRBC 0.0      RBC 4.31 (*)     Hemoglobin 13.1 (*)     Hematocrit 40.0 (*)     MCV 93      MCH 30.4      MCHC 32.8      RDW 12.2      Platelets 197      Neutrophils % 74.5      Immature Granulocytes %, Automated 0.3      Lymphocytes % 17.7      Monocytes % 6.5      Eosinophils % 0.5      Basophils % 0.5      Neutrophils Absolute 6.49      Immature Granulocytes Absolute, Automated 0.03      Lymphocytes Absolute 1.54      Monocytes Absolute 0.57      Eosinophils Absolute 0.04      Basophils Absolute 0.04     COMPREHENSIVE METABOLIC PANEL - Abnormal    Glucose 97      Sodium 143      Potassium 3.6      Chloride 105      Bicarbonate 25      Urea Nitrogen 16      Creatinine 1.10      eGFR 75      Calcium 9.2      Albumin 4.4      Alkaline Phosphatase 85      Total Protein 7.0      AST 23      Bilirubin, Total 1.5 (*)     ALT 14      Anion Gap 13     URINALYSIS WITH REFLEX MICROSCOPIC - Abnormal    Color, Urine Light-Yellow      Appearance, Urine Clear      Specific Gravity, Urine 1.031      pH, Urine 6.0      Protein, Urine 30 (1+) (*)     Glucose, Urine Normal      Blood, Urine 0.1 (1+) (*)     Ketones, Urine 40 (2+) (*)     Bilirubin, Urine NEGATIVE      Urobilinogen, Urine Normal      Nitrite, Urine NEGATIVE      Leukocyte Esterase, Urine NEGATIVE     MICROSCOPIC ONLY, URINE - Abnormal    WBC, Urine NONE      RBC, Urine 6-10 (*)     Mucus, Urine 1+     LIPASE - Normal    Lipase 24         As interpreted by me, the above displayed labs are abnormal. All other labs obtained during this visit were within normal range or not returned as of this dictation.      CT abdomen pelvis w IV contrast   Final Result   1. No acute intra-abdominal abnormality. No dilated loops of bowel        2. Cholelithiasis        3.  Uncomplicated descending and sigmoid diverticulosis.        4. Prostate hypertrophy. Correlate with patient's PSA levels.             MACRO:   None        Signed by: Sue Adame 1/17/2024 1:14 PM   Dictation workstation:   [id number]              CT abdomen pelvis w IV contrast   Final Result   1. No acute intra-abdominal abnormality. No dilated loops of bowel        2. Cholelithiasis        3. Uncomplicated descending and sigmoid diverticulosis.        4. Prostate hypertrophy. Correlate with patient's PSA levels.             MACRO:   None        Signed by: Sue Adame 1/17/2024 1:14 PM   Dictation workstation:   [id number]              ------------------------------ ED COURSE/MEDICAL DECISION MAKING----------------------  Medical Decision Making:   Exam: A medically appropriate exam performed, outlined above, given the known history and presentation.    History obtained from: Review of medical record nursing notes patient      Social Determinants of Health considered during this visit: Takes care of himself at home      PAST MEDICAL HISTORY/Chronic Conditions Affecting Care     has no past medical history on file.       CC/HPI Summary, Social Determinants of health, Records Reviewed, DDx, testing done/not done, ED Course, Reassessment, disposition considerations/shared decision making with patient, consults, disposition:   Presents for abdominal pain difficulty with bowel movements abdominal pain described as tightening history of reflux  Plan  GI cocktail  Bentyl  Pepcid  Vistaril  Toradol  Normal saline  CBC  CMP  Urine  Lipase  CT abdomen pelvis-1. No acute intra-abdominal abnormality. No dilated loops of bowel  2. Cholelithiasis  3. Uncomplicated descending and sigmoid diverticulosis.  4. Prostate hypertrophy. Correlate with patient's PSA levels.    Medical Decision Making/Differential Diagnosis:  Differentials include but not limited to reflux versus biliary colic versus acute cholecystitis versus  obstruction versus UTI versus constipation versus medication reaction patient was started on Augmentin yesterday  Review:  Urine negative for leukocytes nitrates ketones are plus 2+ blood plus protein negative for WBCs  Lipase 24  White blood cell count 8.6  Hemoglobin 13.1  Glucose 97  Electrolytes within normal limits  BUN 16  Creatinine 1.1  LFTs within normal limits  Total bili slightly elevated 1.5 patient is not jaundiced  Upon reevaluation patient reports improvement of his symptoms after receiving the medications here.  Reports his talking is also better.  Urine is not consistent with UTI did show ketones plus blood and protein.  Did receive IV fluids.  Will have him follow-up with primary care physician.  Lipase was normal.  No elevation white blood cell count indicate infection.  Electrolytes within normal limits patient is not anemic LFTs within normal limits bili total slightly elevated at 1.5 however patient is not jaundiced.  CT of the abdomen pelvis showed cholelithiasis without cholecystitis.  Uncomplicated descending and sigmoid diverticulosis without diverticulitis prostate hypertrophy.  No acute intra-abdominal abnormalities no dilated loops of the bowel.  Patient was given referral to gastroenterology.  This has been an ongoing issue for him for several years.  Suspect he may have exacerbated he was seen yesterday placed on Augmentin for sinusitis.  Patient was given Carafate for home Bentyl for abdominal spasming and Pepcid for reflux.  Referral to gastroenterology.  Advise follow-up within 1 week.  His dysphagia has been ongoing for several years.  Advised supportive care measures at home advised to stop using a toothbrush to stick down his throat.  This may be irritating the throat causing inflammation.  Patient reports improvement of his symptoms today.  His incidental findings were discussed with him on CAT scan.  Patient was seen evaluated with attending physician Dr. Hirsch.  Patient is also  medicated with Vistaril to help with his anxiety.  Verbalizes understanding of these findings here today.  Based on his clinical presentation history and symptoms consistent with dysphagia which is chronic for him generalized abdominal pain.    PROCEDURES  Unless otherwise noted below, none      CONSULTS:   None      Diagnoses as of 01/27/24 1005   Generalized abdominal pain   Dysphagia, unspecified type         This patient has remained hemodynamically stable during their ED course.      Critical Care: none       Counseling:  The emergency provider has spoken with the patient and discussed today’s results, in addition to providing specific details for the plan of care and counseling regarding the diagnosis and prognosis.  Questions are answered at this time and they are agreeable with the plan.         --------------------------------- IMPRESSION AND DISPOSITION ---------------------------------    IMPRESSION  1. Generalized abdominal pain    2. Dysphagia, unspecified type        DISPOSITION  Disposition: Discharge home  Patient condition is stable improved        NOTE: This report was transcribed using voice recognition software. Every effort was made to ensure accuracy; however, inadvertent computerized transcription errors may be present      SHERYL Young  01/17/24 1512       SHERYL Young  01/27/24 1005

## 2024-01-17 NOTE — ED TRIAGE NOTES
PT experiencing abd pain/tightness starting yesterday. PT was seen yesterday for throwing up blood and congestion. PT having tightness in abdomen today.

## 2024-01-18 ENCOUNTER — HOSPITAL ENCOUNTER (EMERGENCY)
Facility: HOSPITAL | Age: 64
Discharge: HOME | End: 2024-01-18
Attending: EMERGENCY MEDICINE
Payer: MEDICAID

## 2024-01-18 ENCOUNTER — APPOINTMENT (OUTPATIENT)
Dept: RADIOLOGY | Facility: HOSPITAL | Age: 64
End: 2024-01-18
Payer: MEDICAID

## 2024-01-18 VITALS
RESPIRATION RATE: 18 BRPM | HEIGHT: 70 IN | TEMPERATURE: 98.6 F | HEART RATE: 66 BPM | WEIGHT: 155 LBS | OXYGEN SATURATION: 99 % | BODY MASS INDEX: 22.19 KG/M2 | SYSTOLIC BLOOD PRESSURE: 167 MMHG | DIASTOLIC BLOOD PRESSURE: 105 MMHG

## 2024-01-18 DIAGNOSIS — J38.00 VOCAL CORD PARALYSIS: Primary | ICD-10-CM

## 2024-01-18 LAB
ANION GAP SERPL CALC-SCNC: 12 MMOL/L
BASOPHILS # BLD AUTO: 0.03 X10*3/UL (ref 0–0.1)
BASOPHILS NFR BLD AUTO: 0.4 %
BUN SERPL-MCNC: 12 MG/DL (ref 8–25)
CALCIUM SERPL-MCNC: 9 MG/DL (ref 8.5–10.4)
CHLORIDE SERPL-SCNC: 103 MMOL/L (ref 97–107)
CO2 SERPL-SCNC: 24 MMOL/L (ref 24–31)
CREAT SERPL-MCNC: 1 MG/DL (ref 0.4–1.6)
EGFRCR SERPLBLD CKD-EPI 2021: 85 ML/MIN/1.73M*2
EOSINOPHIL # BLD AUTO: 0.1 X10*3/UL (ref 0–0.7)
EOSINOPHIL NFR BLD AUTO: 1.2 %
ERYTHROCYTE [DISTWIDTH] IN BLOOD BY AUTOMATED COUNT: 12.2 % (ref 11.5–14.5)
FLUAV RNA RESP QL NAA+PROBE: NOT DETECTED
FLUBV RNA RESP QL NAA+PROBE: NOT DETECTED
GLUCOSE SERPL-MCNC: 103 MG/DL (ref 65–99)
HCT VFR BLD AUTO: 40.9 % (ref 41–52)
HGB BLD-MCNC: 13.5 G/DL (ref 13.5–17.5)
IMM GRANULOCYTES # BLD AUTO: 0.02 X10*3/UL (ref 0–0.7)
IMM GRANULOCYTES NFR BLD AUTO: 0.2 % (ref 0–0.9)
LYMPHOCYTES # BLD AUTO: 1.97 X10*3/UL (ref 1.2–4.8)
LYMPHOCYTES NFR BLD AUTO: 23.4 %
MCH RBC QN AUTO: 31 PG (ref 26–34)
MCHC RBC AUTO-ENTMCNC: 33 G/DL (ref 32–36)
MCV RBC AUTO: 94 FL (ref 80–100)
MONOCYTES # BLD AUTO: 0.54 X10*3/UL (ref 0.1–1)
MONOCYTES NFR BLD AUTO: 6.4 %
NEUTROPHILS # BLD AUTO: 5.77 X10*3/UL (ref 1.2–7.7)
NEUTROPHILS NFR BLD AUTO: 68.4 %
NRBC BLD-RTO: 0 /100 WBCS (ref 0–0)
PLATELET # BLD AUTO: 183 X10*3/UL (ref 150–450)
POTASSIUM SERPL-SCNC: 3.5 MMOL/L (ref 3.4–5.1)
RBC # BLD AUTO: 4.36 X10*6/UL (ref 4.5–5.9)
SARS-COV-2 RNA RESP QL NAA+PROBE: NOT DETECTED
SODIUM SERPL-SCNC: 139 MMOL/L (ref 133–145)
WBC # BLD AUTO: 8.4 X10*3/UL (ref 4.4–11.3)

## 2024-01-18 PROCEDURE — 2500000004 HC RX 250 GENERAL PHARMACY W/ HCPCS (ALT 636 FOR OP/ED): Performed by: PHYSICIAN ASSISTANT

## 2024-01-18 PROCEDURE — 85025 COMPLETE CBC W/AUTO DIFF WBC: CPT | Performed by: PHYSICIAN ASSISTANT

## 2024-01-18 PROCEDURE — 71046 X-RAY EXAM CHEST 2 VIEWS: CPT

## 2024-01-18 PROCEDURE — 80048 BASIC METABOLIC PNL TOTAL CA: CPT | Performed by: PHYSICIAN ASSISTANT

## 2024-01-18 PROCEDURE — 99284 EMERGENCY DEPT VISIT MOD MDM: CPT | Performed by: EMERGENCY MEDICINE

## 2024-01-18 PROCEDURE — 99283 EMERGENCY DEPT VISIT LOW MDM: CPT | Mod: 25

## 2024-01-18 PROCEDURE — 2500000002 HC RX 250 W HCPCS SELF ADMINISTERED DRUGS (ALT 637 FOR MEDICARE OP, ALT 636 FOR OP/ED): Performed by: PHYSICIAN ASSISTANT

## 2024-01-18 PROCEDURE — 87636 SARSCOV2 & INF A&B AMP PRB: CPT | Performed by: PHYSICIAN ASSISTANT

## 2024-01-18 PROCEDURE — 36415 COLL VENOUS BLD VENIPUNCTURE: CPT | Performed by: PHYSICIAN ASSISTANT

## 2024-01-18 RX ORDER — IPRATROPIUM BROMIDE AND ALBUTEROL SULFATE 2.5; .5 MG/3ML; MG/3ML
3 SOLUTION RESPIRATORY (INHALATION) ONCE
Status: COMPLETED | OUTPATIENT
Start: 2024-01-18 | End: 2024-01-18

## 2024-01-18 RX ADMIN — SODIUM CHLORIDE 1000 ML: 900 INJECTION, SOLUTION INTRAVENOUS at 10:47

## 2024-01-18 RX ADMIN — IPRATROPIUM BROMIDE AND ALBUTEROL SULFATE 3 ML: 2.5; .5 SOLUTION RESPIRATORY (INHALATION) at 10:47

## 2024-01-18 ASSESSMENT — PAIN SCALES - GENERAL
PAINLEVEL_OUTOF10: 0 - NO PAIN
PAINLEVEL_OUTOF10: 0 - NO PAIN

## 2024-01-18 ASSESSMENT — COLUMBIA-SUICIDE SEVERITY RATING SCALE - C-SSRS
1. IN THE PAST MONTH, HAVE YOU WISHED YOU WERE DEAD OR WISHED YOU COULD GO TO SLEEP AND NOT WAKE UP?: NO
2. HAVE YOU ACTUALLY HAD ANY THOUGHTS OF KILLING YOURSELF?: NO
6. HAVE YOU EVER DONE ANYTHING, STARTED TO DO ANYTHING, OR PREPARED TO DO ANYTHING TO END YOUR LIFE?: NO

## 2024-01-18 ASSESSMENT — PAIN - FUNCTIONAL ASSESSMENT: PAIN_FUNCTIONAL_ASSESSMENT: 0-10

## 2024-01-18 NOTE — ED NOTES
Pt states he feels that his left side of his nose is congested and feels like it is draining in the back of his throat. Pt states when this happens it causes him to cough up thick mucus secretions that varies in color.      Carmel Burnett, LYNDA  01/18/24 4222

## 2024-01-18 NOTE — ED TRIAGE NOTES
Pt complains of shortness of breath that started yesterday. Pt was recently in the ER for the same complaint. Pt denies chest pain but does report pain while coughing. Pt was given a duoneb by squad with some relief.

## 2024-01-18 NOTE — DISCHARGE INSTRUCTIONS
Please follow-up with Dr. Mahsa Chin.  She is an otolaryngologist.  Please call the number listed on your discharge paperwork to make an appointment.  Please follow-up with your GI provider, Dr. Carmel Velásquez   Please follow-up with your primary care doctor in 1 to 2 days.    Please take Mucinex over-the-counter to loosen mucus.  Please continue using albuterol inhaler as prescribed.

## 2024-01-18 NOTE — ED PROVIDER NOTES
HPI   Chief Complaint   Patient presents with    Shortness of Breath       This is a 63-year-old male with past medical history of vocal cord paralysis who presents the emergency department with complaints of shortness of breath x 3 days.  Patient states that his shortness of breath started Monday.  He states that he woke up this morning and had an acute worsening of his baseline shortness of breath.  He he called his boss because he did not know if he is going to work and she called EMS to have him evaluated emergency department.  He states for years when he breathes in and out there is a wind sound with his breath.  There is no acute changes in the sound today per patient.  Patient states that he has been coughing up mucus over the last couple days and sometimes has difficult time getting the mucus out due to it getting stuck in his throat.  He states that when he inhales to clear mucus from his nose he has to plug his right nostril for to successfully clear.  Patient states that when he is eating or drinking he has to be conscious of how he swallows to prevent the sensation of choking.  Patient states that when he is leaning forward the wind sound when he breathes worsens versus when he is laying back he feels he can breathe better.  Patient was seen in the emergency department the last 2 days.  On January 16 he was seen for shortness of breath, CT scan of soft tissue of neck showed no acute findings.  He was then seen on January 17 for abdominal tightness.  He states that his abdominal tightness has been ongoing for some time and it is due to how he has to breathe in order to clear his throat at times.  No acute changes today and abdominal muscle tenderness.  Patient states that he does have a history of acid reflux.  He takes omeprazole daily.  He states that he had a mechanical fall around [place], and he states that after that fall he has noticed an improvement in his baseline acid reflux.  He states he has  been eating more.      Please see HPI for pertinent positive and negative ROS.                    [place] Coma Scale Score: 15                  Patient History   No past medical history on file.  No past surgical history on file.  No family history on file.  Social History     Tobacco Use    Smoking status: Not on file    Smokeless tobacco: Not on file   Substance Use Topics    Alcohol use: Not on file    Drug use: Not on file       Physical Exam   ED Triage Vitals [01/18/24 1022]   Temp Heart Rate Resp BP   37 °C (98.6 °F) 75 18 138/90      SpO2 Temp src Heart Rate Source Patient Position   99 % -- -- --      BP Location FiO2 (%)     -- --       Physical Exam  GENERAL APPEARANCE: Awake and alert. No acute distress.  No acute respiratory distress.  Patient is speaking without difficulty.  He can swallow and breathe without difficulty.  He was protecting his airway.  He's SpO2 99% on room air.  Patient does have a whistling sound in his throat when he takes deep breaths in and out particularly when he is leaning forward, however, patient states that this sound has been ongoing for years and there is no acute change.  VITAL SIGNS: As per the nurses' triage record.  HEENT: Normocephalic, atraumatic. Extraocular muscles are intact. Conjunctiva are pink. Negative scleral icterus. Mucous membranes are dry. Tongue in the midline. Oropharynx clear, uvula midline.   NECK: Soft, nontender and supple, full gross ROM, no meningeal signs.  CHEST: Nontender to palpation. Clear to auscultation bilaterally. No rales, rhonchi, or wheezing. Symmetric rise and fall of chest wall.   HEART: Clear S1 and S2. Regular rate and rhythm. No murmurs appreciated on auscultation.  Strong and equal pulses in the extremities.  ABDOMEN: Soft, nontender, nondistended, positive bowel sounds, no palpable masses.  MUSCULOSKELETAL: The calves are nontender to palpation. Full gross active range of motion. Ambulating on own with no acute  difficulties  NEUROLOGICAL: Awake, alert and oriented x 3. Motor power intact in the upper and lower extremities. Sensation is intact to light touch in the upper and lower extremities. Patient answering questions appropriately.   IMMUNOLOGICAL: No lymphatic streaking noted  DERMATOLOGIC: Warm and dry without petechiae, rashes, or ecchymosis noted on visible skin.   PYSCH: Cooperative with appropriate mood and affect.  ED Course & MDM   ED Course as of 01/18/24 1444   u Jan 18, 2024   1130 EKG personally interpreted by me performed and 1029    Normal sinus rhythm with a ventricular rate 72 normal axis and intervals no acute ischemic changes [EF]   1233 ENT consult placed    [SC]      ED Course User Index  [EF] Mojgan Valentin DO  [SC] Sofia Billingsley PA-C         Diagnoses as of 01/18/24 1444   Vocal cord paralysis       Medical Decision Making  Parts of this chart have been completed using voice recognition software. Please excuse any errors of transcription.  My thought process and reason for plan has been formulated from the time that I saw the patient until the time of disposition and is not specific to one specific moment during their visit and furthermore my MDM encompasses this entire chart and not only this text box.      HPI: Detailed above.    Exam: A medically appropriate exam performed, outlined above, given the known history and presentation.    History obtained from: Patient    EKG: See my supervising physician's EKG interpretation    Social Determinants of Health considered during this visit: Lives at home    Medications given during visit:  Medications   ipratropium-albuteroL (Duo-Neb) 0.5-2.5 mg/3 mL nebulizer solution 3 mL (3 mL nebulization Given 1/18/24 1047)   sodium chloride 0.9 % bolus 1,000 mL (0 mL intravenous Stopped 1/18/24 1117)        Diagnostic/tests  Labs Reviewed   CBC WITH AUTO DIFFERENTIAL - Abnormal       Result Value    WBC 8.4      nRBC 0.0      RBC 4.36 (*)     Hemoglobin  13.5      Hematocrit 40.9 (*)     MCV 94      MCH 31.0      MCHC 33.0      RDW 12.2      Platelets 183      Neutrophils % 68.4      Immature Granulocytes %, Automated 0.2      Lymphocytes % 23.4      Monocytes % 6.4      Eosinophils % 1.2      Basophils % 0.4      Neutrophils Absolute 5.77      Immature Granulocytes Absolute, Automated 0.02      Lymphocytes Absolute 1.97      Monocytes Absolute 0.54      Eosinophils Absolute 0.10      Basophils Absolute 0.03     BASIC METABOLIC PANEL - Abnormal    Glucose 103 (*)     Sodium 139      Potassium 3.5      Chloride 103      Bicarbonate 24      Urea Nitrogen 12      Creatinine 1.00      eGFR 85      Calcium 9.0      Anion Gap 12     INFLUENZA A AND B PCR - Normal    Flu A Result Not Detected      Flu B Result Not Detected      Narrative:     This assay is an in vitro diagnostic multiplex nucleic acid amplification test for the detection and discrimination of Influenza A & B from nasopharyngeal specimens, and has been validated for use at [facility]. Negative results do not preclude Influenza A/B infections, and should not be used as the sole basis for diagnosis, treatment, or other management decisions. If Influenza A/B and RSV PCR results are negative, testing for Parainfluenza virus, Adenovirus and Metapneumovirus is routinely performed for [facility] pediatric oncology and intensive care inpatients, and is available on other patients by placing an add-on request.   SARS-COV-2 PCR, SYMPTOMATIC - Normal    Coronavirus 2019, PCR Not Detected      Narrative:     This assay has received FDA Emergency Use Authorization (EUA) and is only authorized for the duration of time that circumstances exist to justify the authorization of the emergency use of in vitro diagnostic tests for the detection of SARS-CoV-2 virus and/or diagnosis of COVID-19 infection under section 564(b)(1) of the Act, 21 U.S.C. 360bbb-3(b)(1). This assay is an in vitro diagnostic nucleic  acid amplification test for the qualitative detection of SARS-CoV-2 from nasopharyngeal specimens and has been validated for use at [facility]. Negative results do not preclude COVID-19 infections and should not be used as the sole basis for diagnosis, treatment, or other management decisions.        XR chest 2 views   Final Result   No acute cardiopulmonary process.             Signed by: Hema Ibanthanh 1/18/2024 11:49 AM   Dictation workstation:   [id number]           Considerations/further MDM:  Patient was seen in conjucntion with my supervising physician,  Dr. Valentin. Please refer to her note.    I have considered and evaluated for acute bronchitis, acute sinusitis, acute pharyngitis, acute laryngitis, pneumonia including aspiration pneumonia, viral syndrome    Patient does not have chest pain, and shortness of breath is not coming from chest coughing or chest wall tenderness, therefore troponin T HS was discontinued.  Shortness of of breath is coming from the sensation in his mid throat.    I discussed the case with ENT on-call, Dr. Montemayor.  He reviewed patient's chart including the previous otolaryngologist that he saw, and his workup so far for vocal cord paralysis.  He states that he does need to follow-up with otolaryngologist for continued management of vocal cord dysfunction.  He states that if patient has vocal cord paralysis and secondary lung infection like bronchitis or pneumonia this could worsen baseline level of breathing with vocal cord paralysis..  Chest x-ray shows no evidence of pneumonia or pneumothorax.  Patient is afebrile.  He is not hypoxic.  He is able to clear mucus and I saw patient clear mucus successfully while I was in the room.  There is no indication for trach or intubation at this time.  Patient states that there is no acute changes in the whistling sound in his throat when he breathes in and out or difficulty with clear mucus.  Patient was educated  extensively that he needs to follow-up with otolaryngology listed on his discharge paperwork, Dr. Chin.  He was educated that he will need consistent follow-up with otolaryngology to manage his vocal cord dysfunction.  Patient was also to continue his omeprazole daily for his GERD.  He was told to follow-up with his gastroenterologist, Dr. Velásquez.     Patient verbalized understanding of discharge plan home.  He was comfortable discharge plan home.  He was released in good condition.  Patient to return to the emergency department immediately with new or worsening symptoms of the onset of new symptoms.        Procedure  Procedures     Sofia Billingsley PA-C  01/18/24 1448

## 2024-01-24 LAB
ATRIAL RATE: 72 BPM
P AXIS: 41 DEGREES
P OFFSET: 193 MS
P ONSET: 149 MS
PR INTERVAL: 136 MS
Q ONSET: 217 MS
QRS COUNT: 12 BEATS
QRS DURATION: 74 MS
QT INTERVAL: 414 MS
QTC CALCULATION(BAZETT): 453 MS
QTC FREDERICIA: 440 MS
R AXIS: 31 DEGREES
T AXIS: 55 DEGREES
T OFFSET: 424 MS
VENTRICULAR RATE: 72 BPM